# Patient Record
Sex: MALE
[De-identification: names, ages, dates, MRNs, and addresses within clinical notes are randomized per-mention and may not be internally consistent; named-entity substitution may affect disease eponyms.]

---

## 2017-03-16 ENCOUNTER — RX ONLY (OUTPATIENT)
Age: 52
Setting detail: RX ONLY
End: 2017-03-16

## 2017-03-16 RX ORDER — DOXYCYCLINE HYCLATE 150 MG/1
TABLET, COATED ORAL
Qty: 30 | Refills: 0 | Status: CANCELLED
Stop reason: CLARIF

## 2017-07-17 ENCOUNTER — APPOINTMENT (RX ONLY)
Dept: URBAN - METROPOLITAN AREA CLINIC 71 | Facility: CLINIC | Age: 52
Setting detail: DERMATOLOGY
End: 2017-07-17

## 2017-07-17 VITALS — HEIGHT: 74 IN | WEIGHT: 250 LBS

## 2017-07-17 DIAGNOSIS — L71.8 OTHER ROSACEA: ICD-10-CM | Status: WELL CONTROLLED

## 2017-07-17 PROCEDURE — 99213 OFFICE O/P EST LOW 20 MIN: CPT

## 2017-07-17 PROCEDURE — ? FOLLOW UP FOR NEXT VISIT

## 2017-07-17 PROCEDURE — ? PRESCRIPTION

## 2017-07-17 PROCEDURE — ? COUNSELING

## 2017-07-17 RX ORDER — METRONIDAZOLE 10 MG/G
GEL TOPICAL QHS
Qty: 1 | Refills: 3 | Status: ERX | COMMUNITY
Start: 2017-07-17

## 2017-07-17 RX ORDER — DOXYCYCLINE HYCLATE 150 MG/1
TABLET, COATED ORAL
Qty: 30 | Refills: 11 | Status: ERX

## 2017-07-17 RX ADMIN — METRONIDAZOLE: 10 GEL TOPICAL at 00:00

## 2017-07-17 ASSESSMENT — LOCATION SIMPLE DESCRIPTION DERM
LOCATION SIMPLE: LEFT CHEEK
LOCATION SIMPLE: RIGHT CHEEK
LOCATION SIMPLE: LEFT FOREHEAD
LOCATION SIMPLE: RIGHT FOREHEAD

## 2017-07-17 ASSESSMENT — LOCATION DETAILED DESCRIPTION DERM
LOCATION DETAILED: LEFT CENTRAL MALAR CHEEK
LOCATION DETAILED: LEFT FOREHEAD
LOCATION DETAILED: RIGHT CENTRAL MALAR CHEEK
LOCATION DETAILED: RIGHT INFERIOR FOREHEAD

## 2017-07-17 ASSESSMENT — SEVERITY ASSESSMENT OVERALL AMONG ALL PATIENTS: IN YOUR EXPERIENCE, AMONG ALL PATIENTS YOU HAVE SEEN WITH THIS CONDITION, HOW SEVERE IS THIS PATIENT'S CONDITION?: MILD

## 2017-07-17 ASSESSMENT — LOCATION ZONE DERM: LOCATION ZONE: FACE

## 2017-07-17 NOTE — HPI: SKIN LESIONS
Is This A New Presentation, Or A Follow-Up?: Skin Lesions
How Severe Is Your Skin Lesion?: mild
Have Your Skin Lesions Been Treated?: been treated
Additional History: Here for annual skin check.  Needs refills for Acticlate which controls flares of red sites on face .  Sx worse in warm weather.

## 2018-07-16 ENCOUNTER — APPOINTMENT (RX ONLY)
Dept: URBAN - METROPOLITAN AREA CLINIC 71 | Facility: CLINIC | Age: 53
Setting detail: DERMATOLOGY
End: 2018-07-16

## 2018-07-16 DIAGNOSIS — L71.8 OTHER ROSACEA: ICD-10-CM | Status: WELL CONTROLLED

## 2018-07-16 PROCEDURE — 99213 OFFICE O/P EST LOW 20 MIN: CPT

## 2018-07-16 PROCEDURE — ? COUNSELING

## 2018-07-16 PROCEDURE — ? TREATMENT REGIMEN

## 2018-07-16 PROCEDURE — ? PRESCRIPTION

## 2018-07-16 RX ORDER — METRONIDAZOLE 10 MG/G
GEL TOPICAL
Qty: 1 | Refills: 1 | Status: ERX

## 2018-07-16 RX ORDER — DOXYCYCLINE HYCLATE 150 MG/1
TABLET, COATED ORAL
Qty: 30 | Refills: 11 | Status: ERX

## 2018-07-16 ASSESSMENT — LOCATION DETAILED DESCRIPTION DERM
LOCATION DETAILED: LEFT FOREHEAD
LOCATION DETAILED: RIGHT INFERIOR FOREHEAD
LOCATION DETAILED: RIGHT CENTRAL MALAR CHEEK
LOCATION DETAILED: LEFT CENTRAL MALAR CHEEK

## 2018-07-16 ASSESSMENT — LOCATION ZONE DERM: LOCATION ZONE: FACE

## 2018-07-16 ASSESSMENT — LOCATION SIMPLE DESCRIPTION DERM
LOCATION SIMPLE: LEFT CHEEK
LOCATION SIMPLE: LEFT FOREHEAD
LOCATION SIMPLE: RIGHT CHEEK
LOCATION SIMPLE: RIGHT FOREHEAD

## 2018-07-16 ASSESSMENT — SEVERITY ASSESSMENT OVERALL AMONG ALL PATIENTS
IN YOUR EXPERIENCE, AMONG ALL PATIENTS YOU HAVE SEEN WITH THIS CONDITION, HOW SEVERE IS THIS PATIENT'S CONDITION?: MINIMAL

## 2019-01-09 ENCOUNTER — RX ONLY (OUTPATIENT)
Age: 54
Setting detail: RX ONLY
End: 2019-01-09

## 2019-01-09 RX ORDER — DOXYCYCLINE HYCLATE 100 MG/1
TABLET, DELAYED RELEASE ORAL
Qty: 60 | Refills: 5 | Status: ERX | COMMUNITY
Start: 2019-01-09

## 2019-01-14 ENCOUNTER — RX ONLY (OUTPATIENT)
Age: 54
Setting detail: RX ONLY
End: 2019-01-14

## 2019-01-14 RX ORDER — DOXYCYCLINE HYCLATE 200 MG/1
TABLET, DELAYED RELEASE ORAL
Qty: 30 | Refills: 6 | Status: ERX | COMMUNITY
Start: 2019-01-14

## 2019-07-15 ENCOUNTER — APPOINTMENT (RX ONLY)
Dept: URBAN - METROPOLITAN AREA CLINIC 45 | Facility: CLINIC | Age: 54
Setting detail: DERMATOLOGY
End: 2019-07-15

## 2019-07-15 DIAGNOSIS — L82.1 OTHER SEBORRHEIC KERATOSIS: ICD-10-CM

## 2019-07-15 DIAGNOSIS — L71.8 OTHER ROSACEA: ICD-10-CM | Status: INADEQUATELY CONTROLLED

## 2019-07-15 DIAGNOSIS — L91.8 OTHER HYPERTROPHIC DISORDERS OF THE SKIN: ICD-10-CM

## 2019-07-15 PROCEDURE — 11200 RMVL SKIN TAGS UP TO&INC 15: CPT

## 2019-07-15 PROCEDURE — ? TREATMENT REGIMEN

## 2019-07-15 PROCEDURE — ? SKIN TAG REMOVAL WITH PATHOLOGY

## 2019-07-15 PROCEDURE — 99213 OFFICE O/P EST LOW 20 MIN: CPT | Mod: 25

## 2019-07-15 PROCEDURE — ? PRESCRIPTION

## 2019-07-15 PROCEDURE — ? COUNSELING

## 2019-07-15 RX ORDER — DOXYCYCLINE HYCLATE 200 MG/1
TABLET, DELAYED RELEASE ORAL
Qty: 30 | Refills: 5 | Status: CANCELLED

## 2019-07-15 RX ORDER — IVERMECTIN 10 MG/G
CREAM TOPICAL
Qty: 1 | Refills: 1 | Status: ERX | COMMUNITY
Start: 2019-07-15

## 2019-07-15 RX ADMIN — IVERMECTIN: 10 CREAM TOPICAL at 13:36

## 2019-07-15 ASSESSMENT — SEVERITY ASSESSMENT OVERALL AMONG ALL PATIENTS
IN YOUR EXPERIENCE, AMONG ALL PATIENTS YOU HAVE SEEN WITH THIS CONDITION, HOW SEVERE IS THIS PATIENT'S CONDITION?: MILD TO MODERATE

## 2019-07-15 ASSESSMENT — LOCATION DETAILED DESCRIPTION DERM
LOCATION DETAILED: RIGHT INFERIOR TEMPLE
LOCATION DETAILED: RIGHT SUPERIOR MEDIAL MALAR CHEEK

## 2019-07-15 ASSESSMENT — LOCATION SIMPLE DESCRIPTION DERM
LOCATION SIMPLE: RIGHT CHEEK
LOCATION SIMPLE: RIGHT TEMPLE

## 2019-07-15 ASSESSMENT — LOCATION ZONE DERM: LOCATION ZONE: FACE

## 2019-07-15 ASSESSMENT — PAIN INTENSITY VAS: HOW INTENSE IS YOUR PAIN 0 BEING NO PAIN, 10 BEING THE MOST SEVERE PAIN POSSIBLE?: NO PAIN

## 2019-07-15 NOTE — PROCEDURE: SKIN TAG REMOVAL WITH PATHOLOGY
Biopsy Type: H and E
Lab: [id number]
Bill [id number] For Specimen Handling/Conveyance To Laboratory?: no
Total Number Of Lesions Treated: 1
Post-Care Instructions: I reviewed with the patient in detail post-care instructions. Patient is to keep the site dry overnight, and then apply bacitracin twice daily until healed.
Hemostasis: Aluminum Chloride
Billing Type: Third-Party Bill
Include Z78.9 (Other Specified Conditions Influencing Health Status) As An Associated Diagnosis?: Yes
Notification Instructions: Patient will be notified of biopsy results. However, patient instructed to call the office if not contacted within 2 weeks.
Medical Necessity Information: It is in your best interest to select a reason for this procedure from the list below. All of these items fulfill various CMS LCD requirements except the new and changing color options.
Consent: Written consent was obtained and risks were reviewed including but not limited to scarring, infection, bleeding, scabbing, incomplete removal, nerve damage and allergy to anesthesia.
Detail Level: Detailed
Additional Anesthesia Volume In Cc: 6
Post Procedure Care: Following the procedure hemostasis was acheived with drysol and bandages.
Skin Tags Removed With: Dermablade
Anesthesia Volume In Cc: 0.5
Medical Necessity Clause: This procedure was medically necessary because the lesions that were treated were:

## 2019-07-15 NOTE — PROCEDURE: REASSURANCE
Hide Include Location In Plan Question?: No
Detail Level: Detailed
Include Location In Plan?: Yes
Detail Level: Zone

## 2019-07-15 NOTE — PROCEDURE: TREATMENT REGIMEN
Initiate Treatment: Soolantra cream apply daily \\nDoryx 200mg 1 daily with a meal
Discontinue Regimen: Metrogel 1% gel
Detail Level: Zone

## 2020-07-15 ENCOUNTER — APPOINTMENT (RX ONLY)
Dept: URBAN - METROPOLITAN AREA CLINIC 45 | Facility: CLINIC | Age: 55
Setting detail: DERMATOLOGY
End: 2020-07-15

## 2020-07-15 VITALS — TEMPERATURE: 98.1 F

## 2020-07-15 DIAGNOSIS — L71.8 OTHER ROSACEA: ICD-10-CM | Status: IMPROVED

## 2020-07-15 PROCEDURE — ? PRESCRIPTION

## 2020-07-15 PROCEDURE — ? TREATMENT REGIMEN

## 2020-07-15 PROCEDURE — 99213 OFFICE O/P EST LOW 20 MIN: CPT

## 2020-07-15 RX ORDER — IVERMECTIN 10 MG/G
CREAM TOPICAL
Qty: 1 | Refills: 1 | Status: ERX

## 2020-07-15 RX ORDER — DOXYCYCLINE HYCLATE 100 MG/1
CAPSULE, GELATIN COATED ORAL
Qty: 30 | Refills: 11 | Status: ERX | COMMUNITY
Start: 2020-07-15

## 2020-07-15 RX ADMIN — DOXYCYCLINE HYCLATE: 100 CAPSULE, GELATIN COATED ORAL at 00:00

## 2020-07-15 ASSESSMENT — SEVERITY ASSESSMENT OVERALL AMONG ALL PATIENTS: IN YOUR EXPERIENCE, AMONG ALL PATIENTS YOU HAVE SEEN WITH THIS CONDITION, HOW SEVERE IS THIS PATIENT'S CONDITION?: MILD

## 2020-07-15 NOTE — PROCEDURE: TREATMENT REGIMEN
Continue Regimen: Soolantra cream apply daily
Modify Regimen: Doryx 200mg 1/2 tablet daily
Detail Level: Zone

## 2021-07-15 ENCOUNTER — APPOINTMENT (RX ONLY)
Dept: URBAN - METROPOLITAN AREA CLINIC 45 | Facility: CLINIC | Age: 56
Setting detail: DERMATOLOGY
End: 2021-07-15

## 2021-07-15 DIAGNOSIS — L71.8 OTHER ROSACEA: ICD-10-CM | Status: INADEQUATELY CONTROLLED

## 2021-07-15 PROCEDURE — ? PRESCRIPTION MEDICATION MANAGEMENT

## 2021-07-15 PROCEDURE — 99214 OFFICE O/P EST MOD 30 MIN: CPT

## 2021-07-15 PROCEDURE — ? PRESCRIPTION

## 2021-07-15 PROCEDURE — ? COUNSELING

## 2021-07-15 RX ORDER — DOXYCYCLINE HYCLATE 100 MG/1
CAPSULE, GELATIN COATED ORAL
Qty: 60 | Refills: 11 | Status: ERX | COMMUNITY
Start: 2021-07-15

## 2021-07-15 RX ORDER — IVERMECTIN 10 MG/G
CREAM TOPICAL
Qty: 1 | Refills: 1 | Status: ERX | COMMUNITY
Start: 2021-07-15

## 2021-07-15 RX ADMIN — IVERMECTIN: 10 CREAM TOPICAL at 00:00

## 2021-07-15 RX ADMIN — DOXYCYCLINE HYCLATE: 100 CAPSULE, GELATIN COATED ORAL at 00:00

## 2021-07-15 ASSESSMENT — SEVERITY ASSESSMENT OVERALL AMONG ALL PATIENTS: IN YOUR EXPERIENCE, AMONG ALL PATIENTS YOU HAVE SEEN WITH THIS CONDITION, HOW SEVERE IS THIS PATIENT'S CONDITION?: MILD

## 2021-07-15 ASSESSMENT — LOCATION SIMPLE DESCRIPTION DERM
LOCATION SIMPLE: LEFT CHEEK
LOCATION SIMPLE: RIGHT CHEEK
LOCATION SIMPLE: NOSE

## 2021-07-15 ASSESSMENT — LOCATION DETAILED DESCRIPTION DERM
LOCATION DETAILED: RIGHT INFERIOR CENTRAL MALAR CHEEK
LOCATION DETAILED: NASAL SUPRATIP
LOCATION DETAILED: LEFT CENTRAL MALAR CHEEK

## 2021-07-15 ASSESSMENT — LOCATION ZONE DERM
LOCATION ZONE: FACE
LOCATION ZONE: NOSE

## 2021-07-15 NOTE — PROCEDURE: PRESCRIPTION MEDICATION MANAGEMENT
Detail Level: Simple
Continue Regimen: Soolantra cream apply daily
Plan: Will decrease dose once fall hits to 1 daily
Modify Regimen: Doxycycline 100mg 1 bid with meals
Render In Strict Bullet Format?: No

## 2021-07-15 NOTE — PROCEDURE: MIPS QUALITY
Quality 110: Preventive Care And Screening: Influenza Immunization: Influenza Immunization not Administered because Patient Refused.
Quality 226: Preventive Care And Screening: Tobacco Use: Screening And Cessation Intervention: Patient screened for tobacco use and is an ex/non-smoker
Detail Level: Detailed
Quality 130: Documentation Of Current Medications In The Medical Record: Current Medications Documented
I have personally evaluated and examined the patient. The Attending was available to me as a supervising provider if needed.

## 2022-07-07 ENCOUNTER — RX ONLY (OUTPATIENT)
Age: 57
Setting detail: RX ONLY
End: 2022-07-07

## 2022-07-07 RX ORDER — PHARMACY COMPOUNDING ACCESSORY
EACH MISCELLANEOUS
Qty: 45 | Refills: 1 | Status: ERX | COMMUNITY
Start: 2022-07-07

## 2022-08-22 ENCOUNTER — APPOINTMENT (RX ONLY)
Dept: URBAN - METROPOLITAN AREA CLINIC 45 | Facility: CLINIC | Age: 57
Setting detail: DERMATOLOGY
End: 2022-08-22

## 2022-08-22 DIAGNOSIS — L71.8 OTHER ROSACEA: ICD-10-CM | Status: IMPROVED

## 2022-08-22 PROCEDURE — ? PRESCRIPTION MEDICATION MANAGEMENT

## 2022-08-22 PROCEDURE — 99214 OFFICE O/P EST MOD 30 MIN: CPT

## 2022-08-22 PROCEDURE — ? COUNSELING

## 2022-08-22 PROCEDURE — ? PRESCRIPTION

## 2022-08-22 RX ORDER — PHARMACY COMPOUNDING ACCESSORY
APPLY EACH MISCELLANEOUS QD
Qty: 45 | Refills: 6 | Status: ERX

## 2022-08-22 RX ORDER — DOXYCYCLINE HYCLATE 100 MG/1
1 CAPSULE, GELATIN COATED ORAL QD
Qty: 30 | Refills: 11 | Status: ERX

## 2022-08-22 ASSESSMENT — LOCATION ZONE DERM
LOCATION ZONE: NOSE
LOCATION ZONE: FACE

## 2022-08-22 ASSESSMENT — LOCATION SIMPLE DESCRIPTION DERM
LOCATION SIMPLE: LEFT CHEEK
LOCATION SIMPLE: NOSE
LOCATION SIMPLE: RIGHT CHEEK

## 2022-08-22 ASSESSMENT — LOCATION DETAILED DESCRIPTION DERM
LOCATION DETAILED: NASAL SUPRATIP
LOCATION DETAILED: LEFT CENTRAL MALAR CHEEK
LOCATION DETAILED: RIGHT INFERIOR CENTRAL MALAR CHEEK

## 2022-08-22 NOTE — PROCEDURE: PRESCRIPTION MEDICATION MANAGEMENT
Detail Level: Simple
Continue Regimen: pharmacy compounding accessory Qd\\nQuantity: 45.0 g\\nSig: Ivermectin 1.1% cream: apply daily
Modify Regimen: Doxycycline 100mg 1 qd with meals
Render In Strict Bullet Format?: No

## 2023-08-21 ENCOUNTER — APPOINTMENT (RX ONLY)
Dept: URBAN - METROPOLITAN AREA CLINIC 45 | Facility: CLINIC | Age: 58
Setting detail: DERMATOLOGY
End: 2023-08-21

## 2023-08-21 DIAGNOSIS — L71.8 OTHER ROSACEA: ICD-10-CM | Status: WELL CONTROLLED

## 2023-08-21 PROCEDURE — ? PRESCRIPTION MEDICATION MANAGEMENT

## 2023-08-21 PROCEDURE — 99213 OFFICE O/P EST LOW 20 MIN: CPT

## 2023-08-21 PROCEDURE — ? COUNSELING

## 2023-08-21 PROCEDURE — ? PRESCRIPTION

## 2023-08-21 RX ORDER — DOXYCYCLINE HYCLATE 100 MG/1
1 CAPSULE, GELATIN COATED ORAL QD
Qty: 90 | Refills: 6 | Status: ERX

## 2023-08-21 RX ORDER — PHARMACY COMPOUNDING ACCESSORY
APPLY EACH MISCELLANEOUS QD
Qty: 45 | Refills: 6 | Status: ERX

## 2023-08-21 NOTE — PROCEDURE: PRESCRIPTION MEDICATION MANAGEMENT
Render In Strict Bullet Format?: No
Detail Level: Zone
Continue Regimen: doxycycline hyclate 100 mg capsule Qd\\nQuantity: 30.0 Capsule  Days Supply: 30\\nSi capsule QD with meals\\n.\\npharmacy compounding accessory Qd\\nQuantity: 45.0 g\\nSig: Ivermectin 1.1% cream: apply daily\\n.

## 2024-08-06 ENCOUNTER — RX ONLY (OUTPATIENT)
Age: 59
Setting detail: RX ONLY
End: 2024-08-06

## 2024-08-06 RX ORDER — DOXYCYCLINE HYCLATE 100 MG/1
1 CAPSULE, GELATIN COATED ORAL QD
Qty: 30 | Refills: 0 | Status: ERX

## 2024-08-28 ENCOUNTER — RX ONLY (OUTPATIENT)
Age: 59
Setting detail: RX ONLY
End: 2024-08-28

## 2024-08-28 RX ORDER — DOXYCYCLINE HYCLATE 100 MG/1
1 CAPSULE, GELATIN COATED ORAL QD
Qty: 30 | Refills: 0 | Status: ERX

## 2024-09-26 ENCOUNTER — APPOINTMENT (RX ONLY)
Dept: URBAN - METROPOLITAN AREA CLINIC 45 | Facility: CLINIC | Age: 59
Setting detail: DERMATOLOGY
End: 2024-09-26

## 2024-09-26 DIAGNOSIS — D22 MELANOCYTIC NEVI: ICD-10-CM | Status: STABLE

## 2024-09-26 DIAGNOSIS — Z80.8 FAMILY HISTORY OF MALIGNANT NEOPLASM OF OTHER ORGANS OR SYSTEMS: ICD-10-CM

## 2024-09-26 DIAGNOSIS — L71.8 OTHER ROSACEA: ICD-10-CM | Status: WELL CONTROLLED

## 2024-09-26 DIAGNOSIS — L57.8 OTHER SKIN CHANGES DUE TO CHRONIC EXPOSURE TO NONIONIZING RADIATION: ICD-10-CM | Status: INADEQUATELY CONTROLLED

## 2024-09-26 PROBLEM — D22.5 MELANOCYTIC NEVI OF TRUNK: Status: ACTIVE | Noted: 2024-09-26

## 2024-09-26 PROCEDURE — 99214 OFFICE O/P EST MOD 30 MIN: CPT

## 2024-09-26 PROCEDURE — ? COUNSELING

## 2024-09-26 PROCEDURE — ? PRESCRIPTION MEDICATION MANAGEMENT

## 2024-09-26 PROCEDURE — ? PRESCRIPTION

## 2024-09-26 RX ORDER — DOXYCYCLINE 40 MG/1
CAPSULE ORAL
Qty: 90 | Refills: 3 | Status: ERX | COMMUNITY
Start: 2024-09-26

## 2024-09-26 RX ADMIN — DOXYCYCLINE: 40 CAPSULE ORAL at 00:00

## 2024-09-26 ASSESSMENT — LOCATION ZONE DERM
LOCATION ZONE: NECK
LOCATION ZONE: TRUNK

## 2024-09-26 ASSESSMENT — LOCATION DETAILED DESCRIPTION DERM
LOCATION DETAILED: RIGHT SUPERIOR MEDIAL UPPER BACK
LOCATION DETAILED: MID TRAPEZIAL NECK
LOCATION DETAILED: EPIGASTRIC SKIN

## 2024-09-26 ASSESSMENT — LOCATION SIMPLE DESCRIPTION DERM
LOCATION SIMPLE: TRAPEZIAL NECK
LOCATION SIMPLE: RIGHT UPPER BACK
LOCATION SIMPLE: ABDOMEN

## 2024-09-26 NOTE — PROCEDURE: PRESCRIPTION MEDICATION MANAGEMENT
Render In Strict Bullet Format?: No
Detail Level: Zone
Discontinue Regimen: doxycycline hyclate 100 mg capsule Qd\\nQuantity: 30.0 Capsule  Days Supply: 30\\nSi capsule QD with meals\\n.
Initiate Treatment: Oracea 40 mg capsule,immediate - delay release \\nQuantity: 90.0 g\\nSig: Take one tab daily.\\n.
Continue Regimen: pharmacy compounding accessory Qd\\nQuantity: 45.0 g\\nSig: Ivermectin 1.1% cream: apply daily\\n.

## 2025-05-05 ENCOUNTER — RX ONLY (RX ONLY)
Age: 60
End: 2025-05-05

## 2025-05-05 RX ORDER — PHARMACY COMPOUNDING ACCESSORY
APPLY EACH MISCELLANEOUS QD
Qty: 45 | Refills: 6 | Status: ERX | COMMUNITY
Start: 2025-05-05

## 2025-05-06 ENCOUNTER — RX ONLY (RX ONLY)
Age: 60
End: 2025-05-06

## 2025-05-06 RX ORDER — PHARMACY COMPOUNDING ACCESSORY
APPLY EACH MISCELLANEOUS QD
Qty: 45 | Refills: 6 | Status: CANCELLED
Stop reason: SDUPTHER

## 2025-07-07 ENCOUNTER — RX ONLY (RX ONLY)
Age: 60
End: 2025-07-07

## 2025-07-07 RX ORDER — DOXYCYCLINE 40 MG/1
CAPSULE ORAL
Qty: 90 | Refills: 3 | Status: ERX